# Patient Record
Sex: MALE | URBAN - METROPOLITAN AREA
[De-identification: names, ages, dates, MRNs, and addresses within clinical notes are randomized per-mention and may not be internally consistent; named-entity substitution may affect disease eponyms.]

---

## 2021-05-19 ENCOUNTER — IMPORTED ENCOUNTER (OUTPATIENT)
Dept: URBAN - METROPOLITAN AREA CLINIC 9 | Facility: CLINIC | Age: 41
End: 2021-05-19

## 2021-10-16 ASSESSMENT — TONOMETRY
OS_IOP_MMHG: 18
OD_IOP_MMHG: 19

## 2021-10-16 ASSESSMENT — VISUAL ACUITY
OS_SC: 20/25 + SN
OD_SC: 20/30 + SN

## 2022-04-20 NOTE — PATIENT DISCUSSION
Patient understands the need for glasses for ALL activities following basic cataract surgery. Patient was informed of possible mild Rx for distance. If patient is unsatisfied with distance vision afterwards, educated patient LASIK enhancement is a possible remedy 4-5 months after vision is stable from initial cataract surgery. Patient understands any LASIK will be out-of-pocket.

## 2022-05-12 NOTE — PATIENT DISCUSSION
CT showed left kidney stone and fatty changes in the liver. No lungs abnormality.      Patient understands the need for glasses for ALL activities following basic cataract surgery. Patient was informed of possible mild Rx for distance. If patient is unsatisfied with distance vision afterwards, educated patient LASIK enhancement is a possible remedy 4-5 months after vision is stable from initial cataract surgery. Patient understands any LASIK will be out-of-pocket.

## 2022-07-04 RX ORDER — HYDROCODONE BITARTRATE AND ACETAMINOPHEN 10; 325 MG/1; MG/1
TABLET ORAL
COMMUNITY
Start: 2021-07-28

## 2022-07-04 RX ORDER — TRAMADOL HYDROCHLORIDE 50 MG/1
TABLET ORAL
COMMUNITY
Start: 2021-10-13

## 2022-07-04 RX ORDER — HYDROCODONE BITARTRATE AND ACETAMINOPHEN 7.5; 325 MG/1; MG/1
TABLET ORAL
COMMUNITY
Start: 2021-08-25

## 2022-07-04 RX ORDER — HYDROCODONE BITARTRATE AND ACETAMINOPHEN 5; 325 MG/1; MG/1
TABLET ORAL
COMMUNITY
Start: 2021-08-20

## 2022-07-18 LAB
GLUCOSE BLD-MCNC: 115 MG/DL (ref 65–110)
GLUCOSE BLD-MCNC: 98 MG/DL (ref 65–110)

## 2022-08-15 PROBLEM — F32.9 MAJOR DEPRESSIVE DISORDER, SINGLE EPISODE, UNSPECIFIED: Status: ACTIVE | Noted: 2022-08-15

## 2022-08-15 PROBLEM — M65.30 ACQUIRED TRIGGER FINGER: Status: ACTIVE | Noted: 2022-08-15

## 2022-08-15 PROBLEM — E78.00 ELEVATED CHOLESTEROL: Status: ACTIVE | Noted: 2022-08-15

## 2022-08-15 PROBLEM — R53.82 CHRONIC FATIGUE: Status: ACTIVE | Noted: 2022-08-15

## 2022-08-15 PROBLEM — G56.01 CARPAL TUNNEL SYNDROME OF RIGHT WRIST: Status: ACTIVE | Noted: 2022-08-15

## 2022-08-15 PROBLEM — I10 ESSENTIAL HYPERTENSION: Status: ACTIVE | Noted: 2022-08-15

## 2022-08-15 PROBLEM — K21.9 GASTROESOPHAGEAL REFLUX DISEASE: Status: ACTIVE | Noted: 2022-08-15

## 2022-08-15 PROBLEM — H53.8 BLURRED VISION: Status: ACTIVE | Noted: 2022-08-15

## 2022-08-15 PROBLEM — M72.2 BILATERAL PLANTAR FASCIITIS: Status: ACTIVE | Noted: 2022-08-15

## 2022-08-15 PROBLEM — M54.9 BACK PAIN: Status: ACTIVE | Noted: 2022-08-15

## 2022-08-15 PROBLEM — F17.200 SMOKING: Status: ACTIVE | Noted: 2022-08-15

## 2022-08-15 PROBLEM — F41.9 ANXIETY: Status: ACTIVE | Noted: 2022-08-15

## 2022-08-15 PROBLEM — E55.9 VITAMIN D DEFICIENCY: Status: ACTIVE | Noted: 2022-08-15

## 2022-08-15 PROBLEM — G62.9 NEUROPATHY: Status: ACTIVE | Noted: 2022-08-15

## 2022-08-15 PROBLEM — G56.03 BILATERAL CARPAL TUNNEL SYNDROME: Status: ACTIVE | Noted: 2022-08-15

## 2022-08-15 PROBLEM — R42 DIZZINESS: Status: ACTIVE | Noted: 2022-08-15

## 2022-08-15 PROBLEM — E11.9 TYPE 2 DIABETES MELLITUS WITHOUT COMPLICATION (HCC): Status: ACTIVE | Noted: 2022-08-15

## 2022-08-15 PROBLEM — M19.072 ARTHRITIS OF LEFT FOOT: Status: ACTIVE | Noted: 2022-08-15

## 2022-08-15 PROBLEM — G56.02 CARPAL TUNNEL SYNDROME OF LEFT WRIST: Status: ACTIVE | Noted: 2022-08-15

## 2022-10-07 PROBLEM — M65.30 TRIGGERING OF DIGIT: Status: ACTIVE | Noted: 2022-10-07

## 2022-10-07 PROBLEM — M72.2 PLANTAR FASCIITIS: Status: ACTIVE | Noted: 2022-10-07

## 2022-10-07 PROBLEM — M19.079 ARTHRITIS OF JOINT OF TOE: Status: ACTIVE | Noted: 2022-10-07

## 2022-10-07 PROBLEM — F43.10 POSTTRAUMATIC STRESS DISORDER: Status: ACTIVE | Noted: 2022-10-07

## 2022-10-07 PROBLEM — R73.03 PREDIABETES: Status: ACTIVE | Noted: 2022-10-07

## 2022-10-07 PROBLEM — I10 HYPERTENSIVE DISORDER: Status: ACTIVE | Noted: 2022-10-07

## 2022-10-07 PROBLEM — R93.89 ABNORMAL CT SCAN: Status: ACTIVE | Noted: 2022-10-07

## 2022-10-07 PROBLEM — E78.5 HYPERLIPIDEMIA: Status: ACTIVE | Noted: 2022-10-07

## 2022-10-20 NOTE — ANESTHESIA POSTPROCEDURE EVALUATION
Postanesthesia Evaluation        Patient:   Miroslava Shay             MRN: [id number]            FIN: [id number]               Age:   36 years     Sex:  Male     :  1980   Associated Diagnoses:   None   Author:   Selene CHAVARRIA      Postoperative Information   Post Operative Info:          Post operative day: Post Anesthesia Care Unit. Patient location: PACU. Assessment   Postanesthesia assessment   Vitals: Vital Signs   84 92:45 EDT Systolic Blood Pressure 825 mmHg  HI    Diastolic Blood Pressure 80 mmHg    Temperature Oral 36.6 degC    Heart Rate Monitored 76 bpm    Respiratory Rate 18 br/min    SpO2 99 %   3784 51:59 EDT Systolic Blood Pressure 102 mmHg    Diastolic Blood Pressure 74 mmHg    Heart Rate Monitored 89 bpm    Respiratory Rate 18 br/min    SpO2 96 %    10:64 EDT Systolic Blood Pressure 148 mmHg    Diastolic Blood Pressure 61 mmHg    Heart Rate Monitored 76 bpm    Respiratory Rate 22 br/min  HI    SpO2 97 %    01:95 EDT Systolic Blood Pressure 312 mmHg    Diastolic Blood Pressure 56 mmHg  LOW    Temperature Oral 36.6 degC    Heart Rate Monitored 76 bpm    Respiratory Rate 20 br/min    SpO2 100 %    SBP/DBP Cuff Details Left arm    38:69 EDT Systolic Blood Pressure 329 mmHg    Diastolic Blood Pressure 94 mmHg  HI    Temperature Oral 36.6 degC    Heart Rate Monitored 66 bpm    Respiratory Rate 18 br/min    SpO2 98 %      , Measurements from flowsheet : Measurements   2022 12:10 EDT Body Mass Index est mehnaz 26.52 kg/m2    Body Mass Index Measured 26.52 kg/m2   2022 12:09 EDT Patient Stated Height/Length 182.9 cm    Weight Measured 86.2 kg    Weight Dosing 86.2 kg      , stable--see anesthesia record. Respiratory function: Respiratory rate, airway, and oxygen saturation are at adequate levels. Cardiovascular function: Heart Rate stable, Blood Pressure stable, Postoperative hydration status Adequate.      Mental status: appropriate for level of anesthesia. Temperature: within normal limits. Pain Control: Adequate. Nausea/Vomiting: Absent.      Signature Line     Electronically Signed on 07/18/2022 04:14 PM EDT   ________________________________________________   Corbin CHAVARRIA
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
## 2022-10-20 NOTE — OP NOTE
Operative Report    [facility],[address]. Elgin Garcia MD  Service Date: 07/18/2022    PREOPERATIVE DIAGNOSIS:  Right index finger, right thumb trigger  fingers. POSTOPERATIVE DIAGNOSIS:  Right index finger, right thumb trigger  fingers. PROCEDURE PERFORMED:  Right index finger, right thumb trigger release. PROCEDURE:  Right index and right thumb trigger release. COMPLICATIONS:  None known. SURGEON:  Dr. Toro Copeland current Adela Sample. FIRST ASSISTANT:  Baltazar Diggs. ESTIMATED BLOOD LOSS:  3 mL    INDICATION FOR SURGERY:  The patient was marked in the preoperative  holding area. The patient also has triggers of the ring and small  finger and difficulty with the right elbow, status post a right elbow  in situ ulnar nerve decompression. The patient and I discussed the  risks and benefits of surgery and limiting the amount of surgery done  to the hand at one time. The patient was marked and we did discuss  risks and benefits. The patient was supine on the OR stretcher. The  patient and I again had reviewed what the surgery would address the  future surgeries would need to be The patient understood the risks and  benefits and wished to proceed. The patient had lidocaine with epinephrine injected as field blocks. The patient had the thumb approached through an oblique incision at  the A1 pulley of the index finger transversely at the base of the MCP  of the thumb. The patient had tenotomy scissor dissection. The  patient had the neurovascular bundle was clearly seen and identified  in the thumb and the index finger and both digits are fully protected. The patient then had the A1 pulley release of the oblique pulley and  the thumb to the leading edge of A2 and the index finger. The patient  after having the digit released and the thumb and index had both  wounds irrigated had full mobility.   The patient was then irrigated  and sutured with 4-0 chromic taken to recovery room in a small sterile  dressing, taken to recovery room in stable condition.       Jeremy Eastman MD  TR: tn DD: 07/18/2022 14:10 TD: 07/18/2022 14:16 Job#: [id number]  \[id number]\DOC#: [id number]  \[id number]\  Signature Line    Electronically Signed on 07/18/2022 03:04 PM EDT  ________________________________________________  Norbert CHAVARRIA

## 2022-10-20 NOTE — PROCEDURES
IntraOp Record - R Olinda Gaona [address] Record - 7[facility] Summary                                                                   Primary Physician:        Anna CHAVARRIA    Case Number: [id number]    Finalized Date/Time:      22 17:38:11    Pt. Name:                 Patty Irizarry    /Sex:               1980    Male    Med Rec #:                [id number]    Physician:                Anna CHAVARRIA    Financial #:              [id number]    Pt.  Type:                 S    Room/Bed:                 /    Admit/Disch:              22 11:59:00 -                              22 15:14:46    [facility]                                                                                                    Entry 1                         Entry 2                         Entry 3                                          Case Attendee             Anna CHAVARRIA            [address] [place] [address] [address]    Role Performed            Surgeon Primary                 Anesthesiologist                CRNA    Time In                   22 13:51:00               22 13:51:00               22 13:51:00    Time Out                  22 14:13:00               22 14:13:00               22 14:13:00    Procedure                 Finger Tendon Pulley            Finger Tendon Pulley            Finger Tendon [place]                              Release(Right)                  Release(Right)                  Release(Right)    Last Modified By:         Esther WILLIAM                              22 14:26:47               22 14:26:47               22 14:26:47                                Entry 4 Entry 5                         Entry 6                                          Case Attendee             Clif Post    Role Performed            [employer]        Circulator                      Surgical Scrub    Time In                   07/18/22 13:51:00               07/18/22 13:51:00               07/18/22 13:51:00    Time Out                  07/18/22 14:13:00               07/18/22 14:13:00               07/18/22 14:13:00    Procedure                 Finger Tendon Pulley            Finger Tendon Pulley            Finger Tendon Austin                              Release(Right)                  Release(Right)                  Release(Right)    Last Modified By:         Ramin WILLIAM                              07/18/22 14:26:47               07/18/22 14:26:47               07/18/22 14:26:47      [facility] - Case Attendance Audit                                                                     07/18/22 14:26:47         Owner: Cristiane Cordero                               Modifier: CEE                                                            1     <+> Time Out            1     <*> Procedure                              Finger Tendon Pulley Release(Right)            2     <+> Time In            2     <+> Time Out            2     <*> Procedure                              Finger Tendon Pulley Release(Right)            3     <+> Time In            3     <+> Time Out            3     <*> Procedure                              Finger Tendon Pulley Release(Right)            4     <+> Time In            4     <+> Time Out            4     <*> Procedure                              Finger Tendon Pulley Release(Right)            5     <+> Time In            5     <+> Time Out            5     <*> Procedure Finger Tendon Pulley Release(Right)            6     <+> Time In            6     <+> Time Out            6     <*> Procedure                              Finger Tendon Pulley Release(Right)     07/18/22 14:08:31         Owner: CEE                               Modifier: ABELPE                                                            1     <+> Time In            1     <*> Procedure                              Finger Tendon Pulley Release(Right)        <+> 2         Case Attendee        <+> 2         Role Performed        <+> 2         Procedure        <+> 3         Case Attendee        <+> 3         Role Performed        <+> 3         Procedure        <+> 4         Case Attendee        <+> 4         Role Performed        <+> 4         Procedure        <+> 5         Case Attendee        <+> 5         Role Performed        <+> 5         Procedure        <+> 6         Case Attendee        <+> 6         Role Performed        <+> 6         Procedure        [facility] - Case Times                                                                                                         Entry 1                                                                                                          Patient      In Room Time             07/18/22 13:51:00               Out Room Time                   07/18/22 14:13:00    Anesthesia     Procedure      Start Time               07/18/22 13:58:00               Stop Time                       07/18/22 14:12:00    Last Modified By:         Brad Soulier L-RN                              07/18/22 14:18:14      [facility] - Case Times Audit                                                                          07/18/22 14:18:14         Owner: Tatiana Barrett                               Modifier: CEE                                                        <+> 1         Out Room Time        <+> 1         Stop Time        [facility] Case Data Entry 1                                                                                                          Case Information      ASA Class                2                               Case Level                      Level 2     OR                       BH 03                           Specialty                       Orthopedic (SN)     Wound Class              1-Clean    Preop Diagnosis           M65.321, M65.351                Postop Diagnosis                RIGHT INDEX FINGER &                                                                                              THUMB TRIGGER FINGERS    Last Modified By:         Kevin WILLIAM                              07/18/22 14:09:44      [facility] - Procedures                                                                                                         Entry 1                                                                                                          Procedure     Description      Procedure                Finger Tendon Pulley            Modifiers                       Right                              Release     Surgical Procedure       RIGHT INDEX, AND THUMB     Text                     TRIGGER RELEASE    Primary Procedure         Yes                             Primary Surgeon                 Charlie CHAVARRIA    Start                     07/18/22 13:58:00               Stop                            07/18/22 14:12:00    Anesthesia Type           Monitored Anesthesia            Surgical Service                Orthopedic (SN)                              Care    Wound Class               1-Clean    Last Modified ByBa WILLIAM                              07/18/22 14:18:49      [address] [place] [address]                                                                                                            Entry 1 ESU Type                  GENERATOR DANISH BIPOLAR        Identification                  [id number]                                                              Number     Bipolar Setting           15                              Endocut                         No    (gallardo)     Grounding Pad             No                              Outcome Met (O.10)              Yes    Needed?      Last Modified By:         Radha WILLIAM                              07/18/22 14:20:56      [address] [facility] - Communication                                                                                                      Entry 1                                                                                                          Communication             Face to Face                    Communication By                Radha WILLIAM    Date and Time             07/18/22 12:55:00               Communication To                PATIENT    Last Modified By:         Radha WILLIAM                              07/18/22 14:12:19      [facility] - Counts Initial and Final                                                                                           Entry 1                                                                                                          Initial Counts      Initial Counts           Clif Brown,             Items included in               Sponges, Sharps     Performed By             Dirk Epstein                the Initial Count     Final Counts      Final Counts             Cheryl Brown,             Final Count Status              Correct     Performed By             Dirk Epstein     Items Included in        Sponges, Sharps     Final Count     Outcome Met (O.20)        Yes    Last Modified ByVinny WILLIAM                              07/18/22 14:13:06      [address] - Counts Additional Entry 1                                                                                                          Additional Count          Closing Count                   Additional Count                Wilian Collado,    Type                                                      Participants                    Clif Brown    Count Status              Correct                         Items Counted                   Sponges, Sharps    Outcome Met (O.20)        Yes    Last Modified By:         Anat WILLIAM                              07/18/22 14:13:30      [facility] - Dressing/Packing                                                                                                   Entry 1                                                                                                          Site                      Hand                            Site Details                    Right    Dressing Item     Details      Dressing Item            Medicated Gauze, 4x4's,     (Im.290)                 Self Adherent Wrap,                              Kerlix/Vianca Wrap    Last Modified ByChristopher WILLIAM                              07/18/22 17:35:29      [facility] - Dressing/Packing Audit                                                                    07/18/22 17:35:29         Owner: ABELPE                               Modifier: ABELPE                                                            1     <*> Dressing Item (Im.290)                 Medicated Gauze, 4x4's, Elastic wrap            1     <-> Cast/Splint (Im.290)                   Cast Padding        [facility] - Fire Risk Assessment                                                                                               Entry 1                                                                                                          Fire Risk                 Alcohol Based Prep              Fire Risk Score 3+    Assessment:  If            Solution, Oxygen    checked, checkmark        Source, Ignition Source    = 1 point                 In Use    Fire Risk Protocol     (Reference Only)     Last Modified By:         Zehra WILLIAM                              07/18/22 14:21:49      [facility] - Fire Risk Assessment Audit                                                                07/18/22 14:21:49         Owner: Aldo Leavitt                               Modifier: ABELPE                                                        <+> 1         Fire Risk Score        [facility] - Medications                                                                                                        Entry 1                                                                                                          Time Administered         07/18/22 13:58:00               Medication                      LIDOCAINE 1% W/EPI INJ    Route of Admin            Local Injection                 Dose/Volume                     20ML                                                              (include amount and                                                               unit of measure)     Site                      Hand                            Site Detail                     Right    Administered By           Luis Manuel CHAVARRIA           Outcome Met (O.130)             Yes    Last Modified ByBrianna WILLIAM                              07/18/22 17:35:51      [facility] - Medications Audit                                                                         07/18/22 17:35:51         Owner: CEE                               Modifier: ABELPE                                                            1     <*> Medication                             INACTIVE ITEM Â *Â *Â * zzLIDOCAINE 1% W/EPI 20ML VIAL            1     <*> Route of Admin                         Local Injection            1     <*> Administered By Allen CHAVARRIA            1     <*> Dose/Volume (include amount and        20ML                      unit of measure)            1     <*> Time Administered                      07/18/22 13:58:00            1     <*> Outcome Met (O.130)                    Yes            1     <*> Site                                   Hand            1     <*> Site Detail                            Right        [facility] - Patient Care Devices                                                                                               Entry 1                                                                                                          Equipment Type            MACHINE SEQUENTIAL              SCD Sleeve Site                 Legs Bilateral                              COMPRESSION    Equipment/Tag Number      [id number]                       Initiated Pre                   Yes                                                              Induction     Last Modified By:         Carl WILLIAM                              07/18/22 14:22:17      [facility] - Patient Positioning                                                                                                Entry 1                                                                                                          Procedure                 Finger Tendon Pulley            Body Position                   Supine                              Release(Right)    Left Arm Position         Resting at Side and             Right Arm Position              Extended on Hand Table                              Padded    Left Leg Position         Extended Security               Right Leg Position              Extended Security                              Strap, Pillow Under Knee                                        Strap, Pillow Under Knee    Feet Uncrossed            Yes                             Pressure Points                 Yes field (or                                                 sterilization     alternative ID band                                       indicators confirmed     used), if applicable     Surgeon shares            Yes                             Anesthesia shares               Yes    operative plan,                                           anesthetic plan and     anticipated                                               reviews patient     specimens                                                 specific concerns     discussed, possible                                       and confirms     difficulties,                                             administration of     expected duration,                                        antibiotics, if     anticipated blood                                         applicable     loss and reviews     all     critical/specific     concerns     Fire risk                 Yes                             Surgeon states:                 Yes    assessment scored                                         Does anyone have     and plan discussed                                        any concerns?  If                                                               you see, suspect,                                                               or feel that                                                               patient care is                                                               being compromised,                                                               speak up for                                                               patient safety     Time Out Complete         07/18/22 13:57:00    Last Modified By:         Thais WILLIAM                              07/18/22 14:00:27      [facility] - Debrief                                                                                                            Entry 1 Procedure                 Finger Tendon Pulley            Final counts                    Yes                              Release(Right)                  correct and                                                               verbally verified                                                               with                                                               surgeon/licensed                                                               independent                                                               practitioner (if                                                               applicable)     Actual procedure          Yes                             Postop diagnosis                Yes    performed confirmed                                       confirmed     Wound                     Yes                             Confirm specimens               Yes    classification                                            and specimens     confirmed                                                 labeled                                                               appropriately (if                                                               applicable)     Equipment problems        Yes                             Ferreira catheter                  Yes    addressed (if                                             removed (if     applicable)                                               applicable)     Patient recovery          Yes                             Debrief Complete                07/18/22 14:12:00    plan confirmed     Last Modified By:         Kemi WILLIAM                              07/18/22 14:24:10      [facility] - Skin Assessment                                                                                                    Entry 1                                                                                                          Skin Integrity            Intact    Last Modified By:         Derrek WILLIAM                              07/18/22 14:22:55      [facility] - Skin Prep                                                                                                          Entry 1                                                                                                          Hair Removal     Skin Prep      Prep Agents (Im.270)     Chlorhexidine Gluconate         Prep Area (Im.270)              Arm Hand Right                              2% w/Alcohol     Prep Area Details        Right                           Prep By                         Roderick CHAVARRIA    Outcome Met (O.100)       Yes    Last Modified By:         Derrek WILLIAM                              07/18/22 14:23:05      [facility] - Transfer                                                                                                           Entry 1                                                                                                          Transferred By            Ty Chapin,             Via                             [facility]                              [facility]    Skin Assessment      Condition                Intact    Last Modified ByJoselito WILLIAM                              07/18/22 14:23:33      Case Comments                                                                                         <None>              Finalized By: Derrek WILLIAM      Document Signatures                                                                             Signed By:           Derrek WILLIAM 07/18/22 14:26          Derrek WILLIAM 07/18/22 17:38      Unfinalized History                                                                                     Date/Time            Username    Reason for Unfinalizing         Freetext Reason for Unfinalizing 07/18/22 17:33       [facility]      Quality Audits
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
## 2022-10-20 NOTE — OP NOTE
Phase II Record - [facility]             Phase II Record - [address] [facility] Summary                                                                  Primary Physician:        Gilda CHAVARRIA    Case Number: [id number]    Finalized Date/Time:      22 15:14:29    Pt. Name:                 Deysi Hernandez    /Sex:               1980    Male    Med Rec #:                [id number]    Physician:                Gilda CHAVARRIA    Financial #:              [id number]    Pt.  Type:                 S    Room/Bed:                 /    Admit/Disch:              22 11:59:00 -    Institution:       [facility] - Case Attendance - Phase II                                                                                         Entry 1                                                                                                          Case Attendee             Luis Angel Simpson              Role Performed                  Post Anesthesia Care                                                                                              Nurse    Last Modified By:         Robyn Ordoñez RN                              22 15:14:04      [address] [place] [address] - Case Times - Phase II                                                                                              Entry 1                                                                                                          Phase II In               22 14:15:00               Phase II Out                    22 14:45:00    Phase II Discharge        22 15:00:00    Time     Last Modified By:         Mikel Biggs RN, Alondra Boyle                              22 15:14:20              Finalized By: Luis Angel Simpson      Document Signatures                                                                             Signed By:           Luis Angel Simpson 22 15:14

## 2022-10-29 NOTE — PRE-PROCEDURE ASSESSMENT
PreOp Record - [address] [facility] Summary                                                                     Primary Physician:        Stefan CHAVARRIA    Case Number: [id number]    Finalized Date/Time:      22 12:36:59    Pt. Name:                 Aileen Tegan PLATT/Sex:               1980    Male    Med Rec #:                [id number]    Physician:                Stefan CHAVARRIA    Financial #:              [id number]    Pt.  Type:                 S    Room/Bed:                 /    Admit/Disch:              22 11:59:00 -    [facility]                                                                                            Entry 1                         Entry 2                                                                          Case Attendee             Stefan ECHOLS    Role Performed            Surgeon Primary                 Preoperative Nurse    Time In     Time Out     Last Modified By:         Fredo ECHOLS                              22 12:05:35               22 12:05:35      [address] [facility] Case Times - [facility]                                                                                                 Entry 1                                                                                                          Patient In Room Time      22 12:05:00               Nurse In Time                   22 12:05:00    Nurse Out Time            22 12:36:00               Patient Ready for               22 12:36:00                                                              Surgery/Procedure     Last Modified By:         Fredo ECHOLS                              22 12:36:49      [address] [facility] Times - PreOp Audit                                                                  22 01:24:57         Owner: [id number]                              Modifier: [id number]                                                       <+> 1         Patient Ready for Surgery/Procedure        <+> 1         Nurse Out Time                Finalized By: Rebecca ECHOLS      Document Signatures                                                                             Signed By:           Rebecca ECHOLS 07/18/22 12:36